# Patient Record
Sex: MALE
[De-identification: names, ages, dates, MRNs, and addresses within clinical notes are randomized per-mention and may not be internally consistent; named-entity substitution may affect disease eponyms.]

---

## 2021-11-15 PROBLEM — Z00.00 ENCOUNTER FOR PREVENTIVE HEALTH EXAMINATION: Status: ACTIVE | Noted: 2021-11-15

## 2022-01-27 ENCOUNTER — APPOINTMENT (OUTPATIENT)
Dept: UROLOGY | Facility: CLINIC | Age: 62
End: 2022-01-27
Payer: MEDICARE

## 2022-01-27 VITALS
SYSTOLIC BLOOD PRESSURE: 124 MMHG | DIASTOLIC BLOOD PRESSURE: 78 MMHG | TEMPERATURE: 97 F | RESPIRATION RATE: 17 BRPM | WEIGHT: 180 LBS | HEIGHT: 64 IN | HEART RATE: 80 BPM | BODY MASS INDEX: 30.73 KG/M2

## 2022-01-27 DIAGNOSIS — R32 UNSPECIFIED URINARY INCONTINENCE: ICD-10-CM

## 2022-01-27 PROCEDURE — 99215 OFFICE O/P EST HI 40 MIN: CPT

## 2022-01-27 PROCEDURE — 51798 US URINE CAPACITY MEASURE: CPT

## 2022-01-27 RX ORDER — TROSPIUM CHLORIDE 20 MG/1
20 TABLET, FILM COATED ORAL
Qty: 90 | Refills: 3 | Status: ACTIVE | COMMUNITY
Start: 2022-01-27 | End: 1900-01-01

## 2022-01-27 NOTE — END OF VISIT
[FreeTextEntry3] : We also discussed third line therapies for OAB, including tibial nerve stimulation, intravesical bladder Botox, and sacral neuromodulation.\par \par The total time spent with the patient includes face to face time as well as time for documentation, ordering medications/labs/procedures, and care coordination, but I acknowledge it does not include time spent on any procedures performed (eg PVR, UDS, Cystoscopy, catheter changes, etc).\par  [Time Spent: ___ minutes] : I have spent [unfilled] minutes of time on the encounter.

## 2022-01-27 NOTE — PHYSICAL EXAM
[General Appearance - Well Developed] : well developed [Normal Appearance] : normal appearance [Edema] : no peripheral edema [Abdomen Soft] : soft [Normal Station and Gait] : the gait and station were normal for the patient's age [] : no rash [No Focal Deficits] : no focal deficits [Not Anxious] : not anxious

## 2022-01-27 NOTE — ASSESSMENT
[FreeTextEntry1] : Plan:\par \par - U/A and culture sent - will call if positive \par \par - PVR today: 0 cc \par \par - Cilias 5mg PO daily \par \par - Trospium 20mg PO daily \par \par

## 2022-01-27 NOTE — HISTORY OF PRESENT ILLNESS
[FreeTextEntry1] : 61 yr old male patient presents to office today for chief complaint of urinary frequency, urgency and urge incontinence x 3 yrs. accompanied by spouse. \par \par Pt recalls of having prostate surgery in 2017 - urological symptoms started since and reports the followings:\par DFT q3-4hrs\par nocturia x1 \par + Urge incontinence\par wears diapers - change 1x \par Denies constipation \par no need to strain to urinate \par Denies dysuria and  hematuria \par \par Pt also reports of erectile dysfunction x 2 years. \par \par Never smoker \par \par Daily fluid intake: 2 cups of regular tea, no coffee, 4-5 cups of water, 1 cup of juice occasionally \par \par PVR: 0 cc\par \par

## 2022-01-28 RX ORDER — TADALAFIL 5 MG/1
5 TABLET ORAL
Qty: 10 | Refills: 11 | Status: ACTIVE | COMMUNITY
Start: 2022-01-28 | End: 1900-01-01

## 2022-01-31 DIAGNOSIS — N39.0 URINARY TRACT INFECTION, SITE NOT SPECIFIED: ICD-10-CM

## 2022-01-31 DIAGNOSIS — B96.20 URINARY TRACT INFECTION, SITE NOT SPECIFIED: ICD-10-CM

## 2022-01-31 LAB
APPEARANCE: CLEAR
BACTERIA: ABNORMAL
BILIRUBIN URINE: NEGATIVE
BLOOD URINE: NEGATIVE
COLOR: YELLOW
GLUCOSE QUALITATIVE U: ABNORMAL
HYALINE CASTS: 1 /LPF
KETONES URINE: NEGATIVE
LEUKOCYTE ESTERASE URINE: ABNORMAL
MICROSCOPIC-UA: NORMAL
NITRITE URINE: POSITIVE
PH URINE: 5.5
PROTEIN URINE: ABNORMAL
RED BLOOD CELLS URINE: 1 /HPF
SPECIFIC GRAVITY URINE: 1.02
SQUAMOUS EPITHELIAL CELLS: 0 /HPF
UROBILINOGEN URINE: NORMAL
WHITE BLOOD CELLS URINE: 33 /HPF

## 2022-01-31 RX ORDER — SULFAMETHOXAZOLE AND TRIMETHOPRIM 800; 160 MG/1; MG/1
800-160 TABLET ORAL TWICE DAILY
Qty: 10 | Refills: 0 | Status: ACTIVE | COMMUNITY
Start: 2022-01-31 | End: 1900-01-01

## 2022-07-28 ENCOUNTER — APPOINTMENT (OUTPATIENT)
Dept: UROLOGY | Facility: CLINIC | Age: 62
End: 2022-07-28

## 2022-07-28 VITALS
DIASTOLIC BLOOD PRESSURE: 87 MMHG | RESPIRATION RATE: 17 BRPM | BODY MASS INDEX: 30.73 KG/M2 | HEIGHT: 64 IN | SYSTOLIC BLOOD PRESSURE: 149 MMHG | HEART RATE: 76 BPM | WEIGHT: 180 LBS | TEMPERATURE: 97.7 F

## 2022-07-28 DIAGNOSIS — N32.81 OVERACTIVE BLADDER: ICD-10-CM

## 2022-07-28 PROCEDURE — 99072 ADDL SUPL MATRL&STAF TM PHE: CPT

## 2022-07-28 PROCEDURE — 99214 OFFICE O/P EST MOD 30 MIN: CPT

## 2022-07-28 NOTE — HISTORY OF PRESENT ILLNESS
[FreeTextEntry1] : 62 yr old male patient presents to office today for follow up on BPH s/p TURP 2017 and OAB wet and erectile dysfunction. accompanied by spouse. \par \par He was started on Trospium 20 mg last visit with improvement.  He is no longer having UUI episodes.  He is overall satisfied with his urinary symptoms.\par \par He is experiencing constipation now.  Patient is diabetic.  \par \par For his ED, he was trialed on Cialis 5 mg without substantial improvement.  \par \par Never smoker

## 2022-07-28 NOTE — ASSESSMENT
[FreeTextEntry1] : Renew Trospium 20 mg\par \par \par Start gummy fiber vs metamucil  - will need to check if the gummy fiber meets dietary restrictions\par \par \par \par Rec increasing tadalafil to 10 mg, use 1-2 hours before intercourse\par \par \par Will refer to see Dr Pedroza for further evaluation \par \par RTO 6 mo\par \par \par

## 2022-10-10 ENCOUNTER — APPOINTMENT (OUTPATIENT)
Dept: UROLOGY | Facility: CLINIC | Age: 62
End: 2022-10-10

## 2022-10-10 ENCOUNTER — LABORATORY RESULT (OUTPATIENT)
Age: 62
End: 2022-10-10

## 2022-10-10 VITALS
HEART RATE: 69 BPM | DIASTOLIC BLOOD PRESSURE: 84 MMHG | TEMPERATURE: 95.7 F | OXYGEN SATURATION: 97 % | SYSTOLIC BLOOD PRESSURE: 147 MMHG | RESPIRATION RATE: 16 BRPM

## 2022-10-10 DIAGNOSIS — N52.9 MALE ERECTILE DYSFUNCTION, UNSPECIFIED: ICD-10-CM

## 2022-10-10 DIAGNOSIS — E11.9 TYPE 2 DIABETES MELLITUS W/OUT COMPLICATIONS: ICD-10-CM

## 2022-10-10 DIAGNOSIS — R35.0 FREQUENCY OF MICTURITION: ICD-10-CM

## 2022-10-10 PROCEDURE — 99072 ADDL SUPL MATRL&STAF TM PHE: CPT

## 2022-10-10 PROCEDURE — 99215 OFFICE O/P EST HI 40 MIN: CPT

## 2022-10-10 RX ORDER — TADALAFIL 5 MG/1
5 TABLET ORAL DAILY
Qty: 90 | Refills: 3 | Status: ACTIVE | OUTPATIENT
Start: 2022-10-10

## 2022-10-10 RX ORDER — PAPAVER/PHENTOLAMINE/ALPROSTAD 150-5-50
150-5-50 VIAL (EA) INTRACAVERNOSAL
Qty: 1 | Refills: 0 | Status: ACTIVE | COMMUNITY
Start: 2022-10-10 | End: 1900-01-01

## 2022-10-10 RX ORDER — TADALAFIL 20 MG/1
20 TABLET ORAL
Qty: 36 | Refills: 3 | Status: ACTIVE | OUTPATIENT
Start: 2022-10-10

## 2022-10-10 NOTE — HISTORY OF PRESENT ILLNESS
[FreeTextEntry1] : 62M w/ hx of BPH s/p TURP on 2017, OAB and ED. Referred by Dr Samuels\par \par Present to our office today for further work up of ED.\par \par Has previously tried Cialis 5mg daily but reports minimal effect. Was recently bumped up to 10mg dialy. \par \par Still poor response to oral medications \par \par DM \par Obesity \par

## 2022-10-10 NOTE — ASSESSMENT
[FreeTextEntry1] : ED\par -  HbA1c \par - Testosterone level\par - Lipid profile/CRP\par - Cialis 5mg qd and 20mg TIW\par - Will schedule for PDUS, will send trimix to [facility] pharmacy\par \par \par \par The following was discussed with the patient. Erectile dysfunction can affect all ages. Normal quality of erections depends on normal flow of blood through the cavernosal arteries, distention of the corpora cavernosa and closure of the veins draining the penis. I compared getting erectile dysfunction to the feeling of the sink with the force of being in the artery and drain being a vein. This helped the patient understand the physiology of erections. Normal erection is regulated by sensory input from the penis as well as normal arousal and processing of the sexual cues. Complex interplay between the neuroendocrine system and autonomic nervous system as well as sensory input was discussed.\par \par Most common reasons for erectile dysfunction in younger men are performance anxiety, sexual experience, lack of point of reference, and realistic expectations. Congenital abnormalities of vessels can also be identified. They typically present with inability to sustain erection from late teenager to early 20s. Will call with venous leak.\par \par In older men erectile dysfunction can be due to atherosclerosis, elevated cholesterol, low testosterone, diabetes, injury to autonomic nervous system, loss of sensation, abnormal processing of sexual cues and disorder of arousal.\par \par Obesity, smoking, use of statin medication can negatively affect erectile function.\par \par Increasing exercise, healthy eating habits, getting adequate amount of sleep, all of those can be helpful in attaining normal erections.\par \par Treatment typically will start with just oral medication with medication like Cialis or Viagra. Use of those medications was explained to the patient. Difference between on demand and daily use was discussed. Viagra needs to be taken on an empty stomach.\par \par Mechanism of action of erectile dysfunction medication was also discussed.\par \par In men who have failed to respond to oral therapy or who have inadequate response level we then moved to penile Doppler ultrasound after penile injection.\par \par Injection of vasculogenic medication, specifically combination of papaverine, phentolamine and prostaglandin E1 is necessary to achieve adequate erection during penile Doppler ultrasound. If penile Doppler ultrasound is scheduled then a Trimix prescription is sent to the pharmacy and the patient is made aware that he needs to bring the medication on the day of the procedure as we cannot start the medication.\par \par Risks of Trimix injection as well as penile Doppler ultrasound like prolonged erection from the cavernosal injection of Trimix or embarrassment during the penile Doppler ultrasound were discussed with the patient.\par \par Depending on the results of penile Doppler ultrasound we may recommend that the patient see his cardiologist to have cardiac evaluation especially if blood flow through the cavernosal arteries is abnormal.\par \par If venous leak was found or arteriovenous shunting was identified we discussed with patient management of these conditions. Specifically venous leak can be sometimes corrected by modified venous stripping using Darling technique. If everything fails we can place a penile prosthesis however this is the last choice and last step in treating erectile dysfunction.\par \par Use of self injection is often recommended after a patient achieves adequate erection during the penile Doppler ultrasound.\par \par Correction of high hemoglobin A1c, high lipids, low testosterone and cessation of smoking are always recommended.\par \par Prescriptions were given. Side effects were explained. Patient will follow-up with me as scheduled. Risks of priapism explained. \par \par Time spent: 31 min reviewing pertinent notes, lab results, imaging studies, discussing risk, complications, and benefits of each of the therapies and triggers to return to office.\par Discussed goals of therapy, realistic expectations, and alternatives. \par \par Time 41 min \par

## 2022-10-10 NOTE — PHYSICAL EXAM
[General Appearance - Well Developed] : well developed [General Appearance - Well Nourished] : well nourished [Normal Appearance] : normal appearance [Well Groomed] : well groomed [General Appearance - In No Acute Distress] : no acute distress [Abdomen Soft] : soft [Abdomen Tenderness] : non-tender [Costovertebral Angle Tenderness] : no ~M costovertebral angle tenderness [Urethral Meatus] : meatus normal [Urinary Bladder Findings] : the bladder was normal on palpation [Scrotum] : the scrotum was normal [Testes Mass (___cm)] : there were no testicular masses [Edema] : no peripheral edema [] : no respiratory distress [Exaggerated Use Of Accessory Muscles For Inspiration] : no accessory muscle use [Respiration, Rhythm And Depth] : normal respiratory rhythm and effort [Oriented To Time, Place, And Person] : oriented to person, place, and time [Affect] : the affect was normal [Mood] : the mood was normal [Not Anxious] : not anxious [Normal Station and Gait] : the gait and station were normal for the patient's age [No Focal Deficits] : no focal deficits [No Palpable Adenopathy] : no palpable adenopathy

## 2022-10-11 LAB
CHOLEST SERPL-MCNC: 287 MG/DL
CRP SERPL HS-MCNC: 7.34 MG/L
ESTIMATED AVERAGE GLUCOSE: 321 MG/DL
HBA1C MFR BLD HPLC: 12.8 %
HDLC SERPL-MCNC: 54 MG/DL
LDLC SERPL CALC-MCNC: NORMAL MG/DL
NONHDLC SERPL-MCNC: 233 MG/DL
TESTOST FREE SERPL-MCNC: 5 PG/ML
TESTOST SERPL-MCNC: 114 NG/DL
TRIGL SERPL-MCNC: 415 MG/DL

## 2022-10-17 ENCOUNTER — APPOINTMENT (OUTPATIENT)
Dept: CARDIOLOGY | Facility: CLINIC | Age: 62
End: 2022-10-17

## 2022-11-14 ENCOUNTER — APPOINTMENT (OUTPATIENT)
Dept: UROLOGY | Facility: CLINIC | Age: 62
End: 2022-11-14

## 2023-10-25 ENCOUNTER — EMERGENCY (EMERGENCY)
Facility: HOSPITAL | Age: 63
LOS: 1 days | Discharge: HOME | End: 2023-10-25
Attending: EMERGENCY MEDICINE | Admitting: EMERGENCY MEDICINE
Payer: MEDICARE

## 2023-10-25 ENCOUNTER — NON-APPOINTMENT (OUTPATIENT)
Age: 63
End: 2023-10-25

## 2023-10-25 VITALS
SYSTOLIC BLOOD PRESSURE: 155 MMHG | RESPIRATION RATE: 19 BRPM | DIASTOLIC BLOOD PRESSURE: 78 MMHG | OXYGEN SATURATION: 99 % | HEART RATE: 75 BPM

## 2023-10-25 VITALS
OXYGEN SATURATION: 100 % | RESPIRATION RATE: 15 BRPM | SYSTOLIC BLOOD PRESSURE: 159 MMHG | DIASTOLIC BLOOD PRESSURE: 80 MMHG | HEART RATE: 77 BPM | HEIGHT: 64 IN | WEIGHT: 177.03 LBS | TEMPERATURE: 98 F

## 2023-10-25 DIAGNOSIS — E11.621 TYPE 2 DIABETES MELLITUS WITH FOOT ULCER: ICD-10-CM

## 2023-10-25 LAB
ALBUMIN SERPL ELPH-MCNC: 2.8 G/DL — LOW (ref 3.3–5)
ALP SERPL-CCNC: 111 U/L — SIGNIFICANT CHANGE UP (ref 30–120)
ALT FLD-CCNC: 22 U/L — SIGNIFICANT CHANGE UP (ref 10–60)
ANION GAP SERPL CALC-SCNC: 4 MMOL/L — LOW (ref 5–17)
AST SERPL-CCNC: 42 U/L — HIGH (ref 10–40)
BASOPHILS # BLD AUTO: 0.02 K/UL — SIGNIFICANT CHANGE UP (ref 0–0.2)
BASOPHILS NFR BLD AUTO: 0.2 % — SIGNIFICANT CHANGE UP (ref 0–2)
BILIRUB SERPL-MCNC: 0.4 MG/DL — SIGNIFICANT CHANGE UP (ref 0.2–1.2)
BUN SERPL-MCNC: 31 MG/DL — HIGH (ref 7–23)
CALCIUM SERPL-MCNC: 9.8 MG/DL — SIGNIFICANT CHANGE UP (ref 8.4–10.5)
CHLORIDE SERPL-SCNC: 96 MMOL/L — SIGNIFICANT CHANGE UP (ref 96–108)
CO2 SERPL-SCNC: 28 MMOL/L — SIGNIFICANT CHANGE UP (ref 22–31)
CREAT SERPL-MCNC: 1.21 MG/DL — SIGNIFICANT CHANGE UP (ref 0.5–1.3)
EGFR: 67 ML/MIN/1.73M2 — SIGNIFICANT CHANGE UP
EOSINOPHIL # BLD AUTO: 0.51 K/UL — HIGH (ref 0–0.5)
EOSINOPHIL NFR BLD AUTO: 6.2 % — HIGH (ref 0–6)
ERYTHROCYTE [SEDIMENTATION RATE] IN BLOOD: 80 MM/HR — HIGH (ref 0–9)
GLUCOSE SERPL-MCNC: 394 MG/DL — HIGH (ref 70–99)
HCT VFR BLD CALC: 35.5 % — LOW (ref 39–50)
HGB BLD-MCNC: 11.7 G/DL — LOW (ref 13–17)
IMM GRANULOCYTES NFR BLD AUTO: 0.5 % — SIGNIFICANT CHANGE UP (ref 0–0.9)
LACTATE SERPL-SCNC: 1.7 MMOL/L — SIGNIFICANT CHANGE UP (ref 0.7–2)
LYMPHOCYTES # BLD AUTO: 2.15 K/UL — SIGNIFICANT CHANGE UP (ref 1–3.3)
LYMPHOCYTES # BLD AUTO: 26.2 % — SIGNIFICANT CHANGE UP (ref 13–44)
MCHC RBC-ENTMCNC: 26.3 PG — LOW (ref 27–34)
MCHC RBC-ENTMCNC: 33 GM/DL — SIGNIFICANT CHANGE UP (ref 32–36)
MCV RBC AUTO: 79.8 FL — LOW (ref 80–100)
MONOCYTES # BLD AUTO: 0.54 K/UL — SIGNIFICANT CHANGE UP (ref 0–0.9)
MONOCYTES NFR BLD AUTO: 6.6 % — SIGNIFICANT CHANGE UP (ref 2–14)
NEUTROPHILS # BLD AUTO: 4.96 K/UL — SIGNIFICANT CHANGE UP (ref 1.8–7.4)
NEUTROPHILS NFR BLD AUTO: 60.3 % — SIGNIFICANT CHANGE UP (ref 43–77)
NRBC # BLD: 0 /100 WBCS — SIGNIFICANT CHANGE UP (ref 0–0)
PLATELET # BLD AUTO: 178 K/UL — SIGNIFICANT CHANGE UP (ref 150–400)
POTASSIUM SERPL-MCNC: 5.1 MMOL/L — SIGNIFICANT CHANGE UP (ref 3.5–5.3)
POTASSIUM SERPL-SCNC: 5.1 MMOL/L — SIGNIFICANT CHANGE UP (ref 3.5–5.3)
PROT SERPL-MCNC: 8.5 G/DL — HIGH (ref 6–8.3)
RBC # BLD: 4.45 M/UL — SIGNIFICANT CHANGE UP (ref 4.2–5.8)
RBC # FLD: 13.6 % — SIGNIFICANT CHANGE UP (ref 10.3–14.5)
SODIUM SERPL-SCNC: 128 MMOL/L — LOW (ref 135–145)
WBC # BLD: 8.22 K/UL — SIGNIFICANT CHANGE UP (ref 3.8–10.5)
WBC # FLD AUTO: 8.22 K/UL — SIGNIFICANT CHANGE UP (ref 3.8–10.5)

## 2023-10-25 PROCEDURE — 93005 ELECTROCARDIOGRAM TRACING: CPT

## 2023-10-25 PROCEDURE — 87040 BLOOD CULTURE FOR BACTERIA: CPT

## 2023-10-25 PROCEDURE — 73630 X-RAY EXAM OF FOOT: CPT

## 2023-10-25 PROCEDURE — 85025 COMPLETE CBC W/AUTO DIFF WBC: CPT

## 2023-10-25 PROCEDURE — 73630 X-RAY EXAM OF FOOT: CPT | Mod: 26,LT

## 2023-10-25 PROCEDURE — 71045 X-RAY EXAM CHEST 1 VIEW: CPT

## 2023-10-25 PROCEDURE — 99285 EMERGENCY DEPT VISIT HI MDM: CPT | Mod: 25

## 2023-10-25 PROCEDURE — 80053 COMPREHEN METABOLIC PANEL: CPT

## 2023-10-25 PROCEDURE — 93010 ELECTROCARDIOGRAM REPORT: CPT

## 2023-10-25 PROCEDURE — 71045 X-RAY EXAM CHEST 1 VIEW: CPT | Mod: 26

## 2023-10-25 PROCEDURE — 36415 COLL VENOUS BLD VENIPUNCTURE: CPT

## 2023-10-25 PROCEDURE — 85652 RBC SED RATE AUTOMATED: CPT

## 2023-10-25 PROCEDURE — 86140 C-REACTIVE PROTEIN: CPT

## 2023-10-25 PROCEDURE — 99285 EMERGENCY DEPT VISIT HI MDM: CPT

## 2023-10-25 PROCEDURE — 83605 ASSAY OF LACTIC ACID: CPT

## 2023-10-25 RX ORDER — INSULIN HUMAN 100 [IU]/ML
6 INJECTION, SOLUTION SUBCUTANEOUS ONCE
Refills: 0 | Status: COMPLETED | OUTPATIENT
Start: 2023-10-25 | End: 2023-10-25

## 2023-10-25 RX ADMIN — INSULIN HUMAN 6 UNIT(S): 100 INJECTION, SOLUTION SUBCUTANEOUS at 13:31

## 2023-10-25 NOTE — ED ADULT NURSE NOTE - NSFALLUNIVINTERV_ED_ALL_ED
Bed/Stretcher in lowest position, wheels locked, appropriate side rails in place/Call bell, personal items and telephone in reach/Instruct patient to call for assistance before getting out of bed/chair/stretcher/Non-slip footwear applied when patient is off stretcher/[place] to call system/Physically safe environment - no spills, clutter or unnecessary equipment/Purposeful proactive rounding/Room/bathroom lighting operational, light cord in reach Bed/Stretcher in lowest position, wheels locked, appropriate side rails in place/Call bell, personal items and telephone in reach/Instruct patient to call for assistance before getting out of bed/chair/stretcher/Non-slip footwear applied when patient is off stretcher/[place] to call system/Physically safe environment - no spills, clutter or unnecessary equipment/Purposeful proactive rounding/Room/bathroom lighting operational, light cord in reach Bed/Stretcher in lowest position, wheels locked, appropriate side rails in place/Call bell, personal items and telephone in reach/Instruct patient to call for assistance before getting out of bed/chair/stretcher/Non-slip footwear applied when patient is off stretcher/[place] to call system/Physically safe environment - no spills, clutter or unnecessary equipment/Purposeful proactive rounding/Room/bathroom lighting operational, light cord in reach

## 2023-10-25 NOTE — ED PROVIDER NOTE - PATIENT PORTAL LINK FT
You can access the FollowMyHealth Patient Portal offered by [facility] by registering at the following website: [email or web address]. By joining [employer]’s FollowMyHealth portal, you will also be able to view your health information using other applications (apps) compatible with our system. You can access the FollowMyHealth Patient Portal offered by [facility] by registering at the following website: [email or web address]. By joining [employer]’s FollowMyHealth portal, you will also be able to view your health information using other applications (apps) compatible with our system. You can access the FollowMyHealth Patient Portal offered by [facility] by registering at the following website: [email or web address]. By joining [employer]’s FollowMyHealth portal, you will also be able to view your health information using other applications (apps) compatible with our system.

## 2023-10-25 NOTE — CONSULT NOTE ADULT - SUBJECTIVE AND OBJECTIVE BOX
PROGRESS NOTE   Patient is a 63y old  Male who presents with a chief complaint of DM foot ulcer left foot    HPI: Patient is a 63-year-old male with past medical history of diabetes hypertension hyperlipidemia coming in for left great toe wound.  Patient states he has had this wound for over 1 month and has failed outpatient antibiotics.  Patient denies any fever chills.  Patient states has been following up at wound clinic at [facility] but has missed some appointments and advised to come to emergency room for further evaluation    Vital Signs Last 24 Hrs  T(C): 36.4 (25 Oct 2023 11:53), Max: 36.4 (25 Oct 2023 11:53)  T(F): 97.5 (25 Oct 2023 11:53), Max: 97.5 (25 Oct 2023 11:53)  HR: 77 (25 Oct 2023 11:53) (77 - 77)  BP: 159/80 (25 Oct 2023 11:53) (159/80 - 159/80)  BP(mean): --  RR: 15 (25 Oct 2023 11:53) (15 - 15)  SpO2: 100% (25 Oct 2023 11:53) (100% - 100%)    Parameters below as of 25 Oct 2023 11:53  Patient On (Oxygen Delivery Method): room air                              11.7   8.22  )-----------( 178      ( 25 Oct 2023 12:37 )             35.5               10-25    128<L>  |  96  |  31<H>  ----------------------------<  394<H>  5.1   |  28  |  1.21    Ca    9.8      25 Oct 2023 12:37    TPro  8.5<H>  /  Alb  2.8<L>  /  TBili  0.4  /  DBili  x   /  AST  42<H>  /  ALT  22  /  AlkPhos  111  10-25      LOWER EXTREMITY PHYSICAL EXAM:        Constitutional: NAD, AAO x3     Lower extremity focused    Vasc: Left foot PT absent, DP is weekly palpable .  Capillary re-fill time less then 3 seconds digits 1-5 on left foot,   Derm: Chronic stable wound left dorsal hallux measuring 1x1.5cm  fibrous-granular base, - geraldo-wound erythema, -probe to bone,  - purulence, - fluctuance, - malodorous, - streaking     These findings consistent with stable chronic ulcers rule out cellulitis  Neuro: Protective sensation diminished to the level of the digits on b/l    MSK: Muscle strength 5/5 all major muscle groups on b/l.       PROGRESS NOTE   Patient is a 63y old  Male who presents with a chief complaint of DM foot ulcer left foot    HPI: Patient is a 63-year-old male with past medical history of diabetes hypertension hyperlipidemia coming in for left great toe wound.  Patient states he has had this wound for over 1 month and has failed outpatient antibiotics.  Patient denies any fever chills.  Patient states has been following up at wound clinic at [facility] but has missed some appointments and advised to come to emergency room for further evaluation    Vital Signs Last 24 Hrs  T(C): 36.4 (25 Oct 2023 11:53), Max: 36.4 (25 Oct 2023 11:53)  T(F): 97.5 (25 Oct 2023 11:53), Max: 97.5 (25 Oct 2023 11:53)  HR: 77 (25 Oct 2023 11:53) (77 - 77)  BP: 159/80 (25 Oct 2023 11:53) (159/80 - 159/80)  BP(mean): --  RR: 15 (25 Oct 2023 11:53) (15 - 15)  SpO2: 100% (25 Oct 2023 11:53) (100% - 100%)    Parameters below as of 25 Oct 2023 11:53  Patient On (Oxygen Delivery Method): room air                              11.7   8.22  )-----------( 178      ( 25 Oct 2023 12:37 )             35.5               10-25    128<L>  |  96  |  31<H>  ----------------------------<  394<H>  5.1   |  28  |  1.21    Ca    9.8      25 Oct 2023 12:37    TPro  8.5<H>  /  Alb  2.8<L>  /  TBili  0.4  /  DBili  x   /  AST  42<H>  /  ALT  22  /  AlkPhos  111  10-25      LOWER EXTREMITY PHYSICAL EXAM:        Constitutional: NAD, AAO x3     Lower extremity focused    Vasc: Left foot PT absent, DP is weekly palpable .  Capillary re-fill time less then 3 seconds digits 1-5 on left foot,   Derm: Chronic stable wound left dorsal hallux measuring 1x1.5cm  fibrous-granular base, - geraldo-wound erythema, -probe to bone,  - purulence, - fluctuance, - malodorous, - streaking     These findings consistent with stable chronic ulcers rule out cellulitis  Neuro: Protective sensation diminished to the level of the digits on b/l    MSK: Muscle strength 5/5 all major muscle groups on b/l.       PROGRESS NOTE   Patient is a 63y old  Male who presents with a chief complaint of DM foot ulcer left foot    HPI: Patient is a 63-year-old male with past medical history of diabetes hypertension hyperlipidemia coming in for left great toe wound.  Patient states he has had this wound for over 1 month and has failed outpatient antibiotics.  Patient denies any fever chills.  Patient states has been following up at wound clinic at [facility] but has missed some appointments and advised to come to emergency room for further evaluation    Vital Signs Last 24 Hrs  T(C): 36.4 (25 Oct 2023 11:53), Max: 36.4 (25 Oct 2023 11:53)  T(F): 97.5 (25 Oct 2023 11:53), Max: 97.5 (25 Oct 2023 11:53)  HR: 77 (25 Oct 2023 11:53) (77 - 77)  BP: 159/80 (25 Oct 2023 11:53) (159/80 - 159/80)  BP(mean): --  RR: 15 (25 Oct 2023 11:53) (15 - 15)  SpO2: 100% (25 Oct 2023 11:53) (100% - 100%)    Parameters below as of 25 Oct 2023 11:53  Patient On (Oxygen Delivery Method): room air                              11.7   8.22  )-----------( 178      ( 25 Oct 2023 12:37 )             35.5               10-25    128<L>  |  96  |  31<H>  ----------------------------<  394<H>  5.1   |  28  |  1.21    Ca    9.8      25 Oct 2023 12:37    TPro  8.5<H>  /  Alb  2.8<L>  /  TBili  0.4  /  DBili  x   /  AST  42<H>  /  ALT  22  /  AlkPhos  111  10-25      LOWER EXTREMITY PHYSICAL EXAM:        Constitutional: NAD, AAO x3     Lower extremity focused    Vasc: Left foot PT absent, DP is weekly palpable .  Capillary re-fill time less then 3 seconds digits 1-5 on left foot,   Derm: Chronic stable wound left dorsal hallux measuring 1x1.5cm  fibrous-granular base, - geraldo-wound erythema, -probe to bone,  - purulence, - fluctuance, - malodorous, - streaking     These findings consistent with stable chronic ulcers rule out cellulitis  Neuro: Protective sensation diminished to the level of the digits on b/l    MSK: Muscle strength 5/5 all major muscle groups on b/l.

## 2023-10-25 NOTE — ED ADULT NURSE NOTE - OBJECTIVE STATEMENT
62yo male walked into ED, pt c/o left greater toe diabetic ulcer. "dilshad been treated for it". pt denies pain/distress. pt ambulates with. 64yo male walked into ED, pt c/o left greater toe diabetic ulcer. "dilshad been treated for it". pt denies pain/distress. pt ambulates with.

## 2023-10-25 NOTE — ED PROVIDER NOTE - PROGRESS NOTE DETAILS
seen by podiatry Dr. Boudreaux advised po abx and f/u vascular out pt no acute intervention or admission needed

## 2023-10-25 NOTE — ED PROVIDER NOTE - NSFOLLOWUPINSTRUCTIONS_ED_ALL_ED_FT
Follow up with vascular and podiatry  take antibiotics as directed  return to ER for any worsening symptoms     Diabetic Foot Ulcers    AMBULATORY CARE:    A diabetic foot ulcer can be redness over a bony area or an open sore. The ulcer can develop anywhere on your foot or toes. Ulcers usually develop on the bottom of the foot. You may not know you have an ulcer until you notice drainage on your sock. Drainage is fluid that may be yellow, brown, or red. The fluid may also contain pus or blood.  Foot Ulcers    Call your local emergency number (911 in the [place]) if:    You have a fever with chills.    You begin vomiting.    You feel faint or become confused.  Seek care immediately if:    You see new drainage on your sock.    Your foot becomes red, warm, and swollen.    Your foot ulcer has a bad smell or is draining pus.    You feel pain in a foot that used to have little or no feeling.    You see black or dead tissue in or around the ulcer.  Call your doctor if:    The ulcer becomes bigger, deeper, or does not heal.    You have questions or concerns about your condition or care.  Treatment may include a hospital stay and any of the following:    Debridement (removal) of dead tissue may be done in and around your foot ulcer. This procedure may be done in your hospital room.    A bandage help keep your wound area moist and free from infection. The bandage may contain medicines to help the ulcer heal and prevent growth of unhealthy tissue.    Taking the pressure off the area can help it heal. Healthcare providers may use cushions or braces, or custom foot wear may be ordered. You may be asked to stay in bed or use a wheelchair or crutches. These devices help decrease the amount of weight and pressure placed on your foot.    Surgery may be needed if you have an infection and decreased blood flow to your foot.  Care for the ulcer as directed: A bandage will be put on the ulcer. Your healthcare provider will give you instructions on changing your bandage. You may need to clean the ulcer and change the bandage daily. The bandage may contain medicines to help the ulcer heal. You may be asked to put medicine on the ulcer before putting on the bandage. The medicine may also prevent growth of tissue that is not healthy. You may need to cover the ulcer with a plastic bag while you bathe. Ask your healthcare provider for instructions on bathing until your foot heals.    Prevent diabetic foot ulcers: Good foot care may help prevent ulcers, or keep them from getting worse. Ask someone to help you if you are not able to check or care for your feet. The following can help you prevent diabetic foot ulcers:    Keep your blood sugar levels under control. Continue the plan for your diabetes that you and your healthcare provider have discussed. Healthy food choices and taking your medicines as directed may help control blood sugars. Contact your healthcare provider if your blood sugar levels are higher than directed.    Wash your feet each day with soap and warm water. Do not use hot water, because this can injure your foot. Dry your feet gently with a towel after you wash them. Dry between and under your toes.    Apply lotion or a moisturizer on your dry feet. Ask your care team provider what lotions are best to use. Do not put lotion or moisturizer between your toes. Moisture between your toes could lead to skin breakdown.    Check your feet each day. Look at your whole foot, including the bottom, and between and under your toes. Check for wounds, corns, and calluses. Use a mirror to see the bottom of your feet. The skin on your feet may be shiny, tight, or darker than normal. Your feet may also be cold and pale. Feel your feet by running your hands along the tops, bottoms, sides, and between your toes. Redness, swelling, and warmth are signs of blood flow problems that can lead to a foot ulcer. Do not try to remove corns or calluses yourself. Do not ignore small problems, such as dry skin or small wounds. These can become life-threatening over time without proper care.  Diabetic  Foot Care      Cut your toenails correctly. File or cut your toenails straight across. Use a soft brush to clean around your toenails. If your toenails are very thick, you may need to have a care team provider or specialist cut them.    Protect your feet. Do not walk barefoot or wear your shoes without socks. Check your shoes for rocks or other objects that can hurt your feet. Wear cotton socks to help keep your feet dry. Wear socks without toe seams, or wear them with the seams inside out. Change your socks each day. Do not wear socks that are dirty or damp.    Wear shoes that fit well. Wear shoes that do not rub against any area of your feet. Your shoes should be ½ to ¾ inch (1 to 2 centimeters) longer than your feet. Your shoes should also have extra space around the widest part of your feet. Walking or athletic shoes with laces or straps that adjust are best. Ask your care team provider for help to choose shoes that fit you best. Ask your provider if you need to wear an insert, orthotic, or bandage on your feet.  Properly Fitting Shoes      Go to your follow-up visits. Your care team provider will do a foot exam at least 1 time each year. You may need a foot exam more often if you have nerve damage, foot deformities, or ulcers. Your provider will check for nerve damage and how well you can feel your feet. Your provider will check your shoes to see if they fit well.    Do not smoke. Smoking can damage your blood vessels and put you at increased risk for foot ulcers. Ask your care team provider for information if you currently smoke and need help to quit. E-cigarettes or smokeless tobacco still contain nicotine. Talk to your care team provider before you use these products.    Know the risks if you choose to drink alcohol. Alcohol can cause your blood sugar levels to be low if you use insulin. Alcohol can cause high blood sugar levels and weight gain if you drink too much. A drink of alcohol is 12 ounces of beer, 5 ounces of wine, or 1½ ounces of liquor.    Maintain a healthy weight. Ask your healthcare provider what a healthy weight is for you. A healthy weight can help you control your diabetes. Ask your provider to help you create a weight loss plan if needed. Even a 10 to 15 pound weight loss can help you better manage your blood sugar level.  Follow up with your diabetes care provider or foot specialist as directed: You may need to return often to have the ulcer checked. The ulcer may be measured to see if it is getting smaller. Bring any offloading devices or footwear to your follow-up visits so your provider or specialist can check them. Write down your questions so you remember to ask them during your visits.

## 2023-10-25 NOTE — ED PROVIDER NOTE - OBJECTIVE STATEMENT
Patient is a 63-year-old male with past medical history of diabetes hypertension hyperlipidemia coming in for left great toe wound.  Patient states he has had this wound for over 1 month and has failed outpatient antibiotics.  Patient denies any fever chills.  Patient states has been following up at wound clinic at [facility] but has missed some appointments and advised to come to emergency room for further evaluation. Patient is a 63-year-old male with past medical history of diabetes hypertension hyperlipidemia coming in for left great toe wound.  Patient states he has had this wound for over 1 month and has failed outpatient antibiotics.  Patient denies any fever chills.  Patient states has been following up at wound clinic at [facility] but has missed some appointments and advised to come to emergency room for further evaluation. Patient is a 63-year-old male with past medical history of diabetes hypertension hyperlipidemia coming in for left great toe wound.  Patient states he has had this wound for over 1 month and has failed outpatient antibiotics.  Patient denies any fever chills.  Patient states has been following up at wound clinic at [facility] but has missed some appointments and advised to come to emergency room for further evaluation.

## 2023-10-25 NOTE — CONSULT NOTE ADULT - PROBLEM SELECTOR RECOMMENDATION 9
Discussed diagnosis and treatment with patient. All questions asked and answered for patient and patient's family satisfaction    Patient's symptoms consistent with chronic DM foot ulcer rule out cellulitis   X-ray reviewed without emphysematous changes. No acute pathology noted. Vessels calcification noted  Reviewed patient's medical records showing he has severe PAD with posterior artery occlusion. Recs vascular consult and follow up with vascular surgeon for further vascular intervention  WBC within normal limit, vital signs stable   There is nothing to culture at this time   Ordered to apply bactroban on his wounds   Recs a course of ABx as per ED   Pt understands that surgical intervention maybe required at a later date if symptoms due not remit   Patient will follow up with Dr. Raulito Alvarado 3-5 days after discharge. Please call [phone] for any questions   Discussed with all attendings   Thank you for the consult. Discussed diagnosis and treatment with patient. All questions asked and answered for patient and patient's family satisfaction    Patient's symptoms consistent with chronic DM foot ulcer rule out cellulitis   X-ray reviewed without emphysematous changes. No acute pathology noted. Vessels calcification noted  Reviewed patient's medical records showing he has severe PAD with posterior artery occlusion. Recs vascular consult and follow up with vascular surgeon for further vascular intervention  WBC within normal limit, vital signs stable   There is nothing to culture at this time   Ordered to apply bactroban on his wounds   Recs a course of ABx as per ED   Pt understands that surgical intervention maybe required at a later date if symptoms due not remit   Patient will follow up with Dr. Raulito Alvarado 3-5 days after discharge. Please call [phone] for any questions   Discussed with all attendings   Thank you for the consult. Discussed diagnosis and treatment with patient. All questions asked and answered for patient and patient's family satisfaction    Patient's symptoms consistent with chronic DM foot ulcer rule out cellulitis   X-ray reviewed without emphysematous changes. No acute pathology noted. Vessels calcification noted  Reviewed patient's medical records showing he has severe PAD with posterior artery occlusion. Recs vascular consult and follow up with vascular surgeon for further vascular intervention  WBC within normal limit, vital signs stable   There is nothing to culture at this time   Ordered to apply bactroban on his wounds   Recs a course of ABx as per ED   Pt understands that surgical intervention maybe required at a later date if symptoms due not remit   Patient will follow up with Dr. Raulito Alvarado 3-5 days after discharge. Please call [phone] for any questions   Discussed with all attendings   Thank you for the consult.

## 2023-10-25 NOTE — ED PROVIDER NOTE - PROVIDER TOKENS
PROVIDER:[TOKEN:[[id number]]],PROVIDER:[TOKEN:[[id number]]],PROVIDER:[TOKEN:[[id number]]],PROVIDER:[TOKEN:[[id number]]] PROVIDER:[TOKEN:[[id number]]],PROVIDER:[TOKEN:[[id number]]],PROVIDER:[TOKEN:[[id number]]],PROVIDER:[TOKEN:[[id number]]] PROVIDER:[TOKEN:[[id number]]],PROVIDER:[TOKEN:[[id number]]],PROVIDER:[TOKEN:[[id number]]],PROVIDER:[TOKEN:[[id number]]]

## 2023-10-25 NOTE — ED PROVIDER NOTE - CONSTITUTIONAL, MLM
Per Dr. Vanessa Barker has 10mL air instilled.   normal... Well appearing, awake, alert, oriented to person, place, time/situation and in no apparent distress.

## 2023-10-25 NOTE — ED PROVIDER NOTE - NEUROLOGICAL, MLM
The patient is a 2m Male complaining of weakness.
Alert and oriented, no focal deficits, no motor or sensory deficits.

## 2023-10-25 NOTE — ED PROVIDER NOTE - CLINICAL SUMMARY MEDICAL DECISION MAKING FREE TEXT BOX
Caryn:  history of diabetes hypertension hyperlipidemia coming in for chronic left great toe wound.  Patient states he has had this wound for over 1 month and has failed outpatient antibiotics.  Patient denies any fever chills.  Patient states has been following up at wound clinic at [facility] but has missed some appointments and advised to come to emergency room for further evaluation. pt seen by podiatry, no acute issues. stable for continued outpatient follow up and treatment at wound care. Caryn:  history of diabetes hypertension hyperlipidemia coming in for chronic left great toe wound.  Patient states he has had this wound for over 1 month and has failed outpatient antibiotics.  Patient denies any fever chills.  Patient states has been following up at wound clinic at [facility] but has missed some appointments and advised to come to emergency room for further evaluation. pt seen by podiatry, no acute issues. stable for continued outpatient follow up and treatment at wound care. Caryn:  history of diabetes hypertension hyperlipidemia coming in for chronic left great toe wound.  Patient states he has had this wound for over 1 month and has failed outpatient antibiotics.  Patient denies any fever chills.  Patient states has been following up at wound clinic at [facility] but has missed some appointments and advised to come to emergency room for further evaluation. pt seen by podiatry, no acute issues. stable for continued outpatient follow up and treatment at wound care.

## 2023-10-25 NOTE — ED PROVIDER NOTE - CARE PROVIDERS DIRECT ADDRESSES
,[email or web address],DirectAddress_Unknown,[email or web address] ,[email or web address],DirectAddress_Unknown,[email or web address] ,[email or web address],DirectAddress_Unknown,DirectAddress_Unknown

## 2023-10-25 NOTE — ED PROVIDER NOTE - CARE PROVIDER_API CALL
Jerod Wilkerson  Vascular Medicine  [address] [facility], [address] [place] [address]  Phone: [phone]  Fax: [phone]  Follow Up Time:     Manvar-Singh, Pallavi Buddhadev  Vascular Surgery  [address] [facility], Floor 1  [place] [address]  Phone: [phone]  Fax: [phone]  Follow Up Time:     Carina Lanier  Vascular Surgery  [address] [place] [address]  Phone: [phone]  Fax: [phone]  Follow Up Time:     Raulito Alvarado  Podiatric Medicine and Surgery  [address] [place] [address]  Phone: [phone]  Fax: [phone]  Follow Up Time:    Jerod Wilkerson  Vascular Medicine  [address] [facility], [address] [place] [address]  Phone: [phone]  Fax: [phone]  Follow Up Time:     Manvar-Singh, Pallavi Buddhadev  Vascular Surgery  [address] [facility], Floor [address]  [place] [address]  Phone: [phone]  Fax: [phone]  Follow Up Time:     Carina Lanier  Vascular Surgery  [address] [place] [address]  Phone: [phone]  Fax: [phone]  Follow Up Time:     Raulito Alvarado  Podiatric Medicine and Surgery  [address] [place] [address]  Phone: [phone]  Fax: [phone]  Follow Up Time:    Jerod Wilkerson  Vascular Medicine  [address] [facility], [address] [place] [address]  Phone: [phone]  Fax: [phone]  Follow Up Time:     Manvar-Singh, Pallavi Buddhadev  Vascular Surgery  [address] [facility], Floor [address]  [place] [address]  Phone: [phone]  Fax: [phone]  Follow Up Time:     Carina Lanier  Vascular Surgery  [address] [place] [address]  Phone: [phone]  Fax: [phone]  Follow Up Time:     Raulito Alvarado  Podiatric Medicine and Surgery  [address] [place] [address]  Phone: [phone]  Fax: [phone]  Follow Up Time:

## 2023-10-27 LAB
CRP SERPL-MCNC: 9 MG/L — HIGH

## 2023-10-30 LAB

## 2025-04-16 NOTE — ED PROVIDER NOTE - NPI NUMBER (FOR SYSADMIN USE ONLY) :
The patient's goals for the shift include  Patient will remain HDS throughout shift.    The clinical goals for the shift include Patient to sat > 90% on HFNC throughout shift    Over the shift, the patient did make progress toward the following goals.       Problem: Skin  Goal: Participates in plan/prevention/treatment measures  Outcome: Progressing  Goal: Prevent/manage excess moisture  Outcome: Progressing  Goal: Promote skin healing  Outcome: Progressing     Problem: Fall/Injury  Goal: Not fall by end of shift  Outcome: Progressing  Goal: Be free from injury by end of the shift  Outcome: Progressing     Problem: Chronic Conditions and Co-morbidities  Goal: Patient's chronic conditions and co-morbidity symptoms are monitored and maintained or improved  Outcome: Progressing      [[id number]],[[id number]],[[id number]],[[id number]] [[id number]],[[id number]],[[id number]],[[id number]] [[id number]],[[id number]],[[id number]],[[id number]]